# Patient Record
Sex: MALE | Race: WHITE
[De-identification: names, ages, dates, MRNs, and addresses within clinical notes are randomized per-mention and may not be internally consistent; named-entity substitution may affect disease eponyms.]

---

## 2020-02-25 ENCOUNTER — HOSPITAL ENCOUNTER (OUTPATIENT)
Dept: HOSPITAL 47 - RADUSWWP | Age: 25
Discharge: HOME | End: 2020-02-25
Attending: FAMILY MEDICINE
Payer: COMMERCIAL

## 2020-02-25 DIAGNOSIS — R59.0: Primary | ICD-10-CM

## 2020-02-26 NOTE — US
EXAMINATION TYPE: US groin RT

 

DATE OF EXAM: 2/25/2020

 

COMPARISON: NONE

 

CLINICAL HISTORY: R59.0 lymphadenopathy.

 

Patient's lump scanned posterior to testicles. 

Hypoechoic irregular shaped area here, possible small phlegmon or developing abscess. 

 

Prominent lymph nodes seen in groin, largest measuring 1.6 x 0.6cm

 

 

 

IMPRESSION:  A focal area of phlegmon or developing abscess.

## 2020-04-11 ENCOUNTER — HOSPITAL ENCOUNTER (EMERGENCY)
Dept: HOSPITAL 47 - EC | Age: 25
Discharge: HOME | End: 2020-04-11
Payer: COMMERCIAL

## 2020-04-11 VITALS — SYSTOLIC BLOOD PRESSURE: 136 MMHG | HEART RATE: 70 BPM | DIASTOLIC BLOOD PRESSURE: 89 MMHG

## 2020-04-11 VITALS — TEMPERATURE: 98.7 F | RESPIRATION RATE: 18 BRPM

## 2020-04-11 DIAGNOSIS — Z88.0: ICD-10-CM

## 2020-04-11 DIAGNOSIS — H43.391: Primary | ICD-10-CM

## 2020-04-11 PROCEDURE — 99283 EMERGENCY DEPT VISIT LOW MDM: CPT

## 2020-04-11 NOTE — ED
Eye Problem HPI





- General


Chief complaint: Eye Problems


Stated complaint: flashes of light in right eye


Time Seen by Provider: 04/11/20 13:11


Source: patient


Mode of arrival: ambulatory


Limitations: no limitations





- History of Present Illness


Initial comments: 


Patient is a 24-year-old male presents emergency Department with complaints of 

floaters in his right eye 2 days.  Patient states he noticed them mostly when 

he was outside.  Then yesterday he noticed flashes of light that happened 

randomly throughout the day.  He denies any pain in his right eye.  He denies 

any trauma to his head.  He denies any headaches, blurry vision, fever, ear 

pain.  He denies any dizziness.  He denies wearing glasses or contacts.  He has 

never had an eye exam.  He denies any other pertinent past medical history and 

takes no medications.  He has no other complaints at this time.  Upon arrival to

the ER, his vitals are stable.








- Related Data


                                Home Medications











 Medication  Instructions  Recorded  Confirmed


 


No Known Home Medications  07/28/15 04/11/20











                                    Allergies











Allergy/AdvReac Type Severity Reaction Status Date / Time


 


Penicillins Allergy  Unknown Verified 04/11/20 13:52














Review of Systems


ROS Statement: 


Those systems with pertinent positive or pertinent negative responses have been 

documented in the HPI.





ROS Other: All systems not noted in ROS Statement are negative.





Past Medical History


Past Medical History: No Reported History


History of Any Multi-Drug Resistant Organisms: None Reported


Additional Past Surgical History / Comment(s): testicle desension


Past Psychological History: No Psychological Hx Reported


Smoking Status: Never smoker


Past Alcohol Use History: Occasional


Past Drug Use History: None Reported





General Exam





- General Exam Comments


Initial Comments: 





GENERAL: 


Well-appearing, well-nourished and in no acute distress.





HEAD: 


Atraumatic, normocephalic.





EYES:


Pupils equal round and reactive to light, extraocular movements intact, sclera 

anicteric, conjunctiva are normal.  No foreign bodies.  Visual acuity is normal.





ENT: 


TMs normal, nares patent, oropharynx clear without exudates.  Moist mucous 

membranes.





NECK: 


Normal range of motion, supple without lymphadenopathy or JVD.





LUNGS:


 Breath sounds clear to auscultation bilaterally and equal.  No wheezes rales or

rhonchi.





HEART:


Regular rate and rhythm without murmurs, rubs or gallops.





ABDOMEN: 


Soft, nontender, normoactive bowel sounds.  No guarding, no rebound.  No masses 

appreciated.





: Deferred 





EXTREMITIES: 


Normal range of motion, no pitting or edema.  No clubbing or cyanosis.





NEUROLOGICAL: 


Cranial nerves II through XII grossly intact.  Normal speech, normal gait.





PSYCH:


Normal mood, normal affect.





SKIN:


 Warm, Dry, normal turgor, no rashes or lesions noted.


Limitations: no limitations





Course


                                   Vital Signs











  04/11/20 04/11/20





  12:58 14:14


 


Temperature 98.7 F 


 


Pulse Rate 95 70


 


Respiratory 18 18





Rate  


 


Blood Pressure 141/88 136/89


 


O2 Sat by Pulse 99 97





Oximetry  














Medical Decision Making





- Medical Decision Making





Patient is a 24-year-old male with sudden onset of right eye floaters and 

flashes of light over the past 2 days.  He denies any eye pain, headache, 

dizziness.  His exam is unremarkable.  No red flag symptoms.  No complete loss 

of vision, no black curtains.  I spoke with on-call ophthalmologist, Dr. Champion.

 He is okay with patient being discharged and will follow-up with him on Monday.

 Patient is in agreement with this plan and care.  Strict return parameters were

discussed with the patient he verbalizes understanding.  He is stable for 

discharge.  Case discussed with Dr. Lamb. 





Disposition


Clinical Impression: 


 Vitreous floaters of right eye





Disposition: HOME SELF-CARE


Condition: Stable


Instructions (If sedation given, give patient instructions):  Blurred Vision 

(ED)


Additional Instructions: 


Please return to the Emergency Department if symptoms worsen or any other 

concerns.


Follow-up with ophthalmologist as discussed.  Call their office Monday morning 

for appointment.


Is patient prescribed a controlled substance at d/c from ED?: No


Referrals: 


Kian Pollack MD [Primary Care Provider] - 1-2 days


Froilan Dai MD [STAFF PHYSICIAN] - 1-2 days

## 2021-04-08 ENCOUNTER — HOSPITAL ENCOUNTER (EMERGENCY)
Dept: HOSPITAL 47 - EC | Age: 26
Discharge: HOME | End: 2021-04-08
Payer: COMMERCIAL

## 2021-04-08 VITALS
DIASTOLIC BLOOD PRESSURE: 98 MMHG | TEMPERATURE: 98.3 F | RESPIRATION RATE: 18 BRPM | HEART RATE: 107 BPM | SYSTOLIC BLOOD PRESSURE: 149 MMHG

## 2021-04-08 DIAGNOSIS — Z88.0: ICD-10-CM

## 2021-04-08 DIAGNOSIS — Z23: ICD-10-CM

## 2021-04-08 DIAGNOSIS — M79.5: Primary | ICD-10-CM

## 2021-04-08 PROCEDURE — 90715 TDAP VACCINE 7 YRS/> IM: CPT

## 2021-04-08 PROCEDURE — 10120 INC&RMVL FB SUBQ TISS SMPL: CPT

## 2021-04-08 PROCEDURE — 90471 IMMUNIZATION ADMIN: CPT

## 2021-04-08 PROCEDURE — 99282 EMERGENCY DEPT VISIT SF MDM: CPT

## 2021-04-08 NOTE — ED
Skin/Abscess/FB HPI





- General


Chief complaint: Skin/Abscess/Foreign Body


Stated complaint: L leg injury


Time Seen by Provider: 04/08/21 13:24


Source: patient


Mode of arrival: ambulatory


Limitations: no limitations





- History of Present Illness


Initial comments: 





25-year-old male presents to the emergency department with a chief complaint of 

a foreign body in the leg.  Patient reports he was working with a wire wheel and

one of the wires when into his skin in the left leg.  Patient reports it is 

fully embedded into the scanner he is not able to see it.  States it measures 

about 2-3 cm.  Tetanus is not today.  He denies any numbness or tingling.  He 

denies any significant pain at the site of injury.





- Related Data


                                  Previous Rx's











 Medication  Instructions  Recorded


 


Cephalexin [Keflex] 500 mg PO Q6HR #40 cap 04/08/21











                                    Allergies











Allergy/AdvReac Type Severity Reaction Status Date / Time


 


Penicillins Allergy  Unknown Verified 04/08/21 13:17














Review of Systems


ROS Statement: 


Those systems with pertinent positive or pertinent negative responses have been 

documented in the HPI.





ROS Other: All systems not noted in ROS Statement are negative.





Past Medical History


Past Medical History: No Reported History


History of Any Multi-Drug Resistant Organisms: None Reported


Additional Past Surgical History / Comment(s): testicle desension


Past Psychological History: No Psychological Hx Reported


Smoking Status: Never smoker


Past Alcohol Use History: Occasional


Past Drug Use History: None Reported





General Exam


Limitations: no limitations


General appearance: alert, in no apparent distress


Head exam: Present: atraumatic, normocephalic, normal inspection


Eye exam: Present: normal appearance, PERRL, EOMI


Pupils: Present: normal accommodation


ENT exam: Present: normal exam, normal oropharynx, mucous membranes moist


Neck exam: Present: normal inspection, full ROM.  Absent: tenderness


Respiratory exam: Present: normal lung sounds bilaterally.  Absent: respiratory 

distress


Cardiovascular Exam: Present: regular rate, normal rhythm, normal heart sounds


Extremities exam: Present: normal inspection (probable foreign body under the 

skin of the left thigh.), full ROM, normal capillary refill.  Absent: tenderness


Back exam: Present: normal inspection, full ROM.  Absent: tenderness


Neurological exam: Present: alert, oriented X3


Psychiatric exam: Present: normal affect, normal mood


Skin exam: Present: warm, dry, intact, normal color





Course





                                   Vital Signs











  04/08/21





  13:17


 


Temperature 98.3 F


 


Pulse Rate 107 H


 


Respiratory 18





Rate 


 


Blood Pressure 149/98


 


O2 Sat by Pulse 100





Oximetry 














Procedures





- Forgein Body Removal Soft Tissue


Consent Obtained: verbal consent


Site: lower extremity (Left thigh)


Anesthetic Used: lidocaine 1%


Amount (mLs): 2


Foreign Body Suspected: Metal


Foreign Body Removed: yes


Foreign Body Removal Technique: Forceps


Patient Tolerated Procedure: well, no complications





Medical Decision Making





- Medical Decision Making





25-year-old male presents emergency Department with chief complaint of a foreign

 body.  On physical examination, there is a palpable foreign body under the skin

 of the left thigh.  I was able to remove it after making a small incision.  It 

was a piece of wire measuring approximately 3 cm in length.  The laceration site

 was thoroughly irrigated and closed with one suture.  Tetanus was updated.  

Patient will be started on Keflex.  Advised to return for suture removal.  Case 

discussed with Dr. Almanzar.





Disposition


Clinical Impression: 


 Soft tissues foreign body





Disposition: HOME SELF-CARE


Condition: Stable


Instructions (If sedation given, give patient instructions):  Soft Tissue 

Foreign Body (ED)


Additional Instructions: 


Take prescribed medication as directed.  Return to emergency department if sympt

oms worsen.  Please return to the emergency room in 10 days to have sutures 

removed.  Please watch for any signs of infection which may include increased 

pain, swelling, redness, fever or chills.  Please return to emergency room for 

any signs of infection do occur.  Please use clean soap and water over the area 

to prevent scabbing over your stitches.  Please leave wound covered for the 

first 24-48 hours and then leave wound open to air.  Please return to the 

emergency room for any other concerns.


Prescriptions: 


Cephalexin [Keflex] 500 mg PO Q6HR #40 cap


Is patient prescribed a controlled substance at d/c from ED?: No


Referrals: 


Kian Pollack MD [Primary Care Provider] - 1-2 days


Time of Disposition: 13:50

## 2021-08-25 ENCOUNTER — HOSPITAL ENCOUNTER (OUTPATIENT)
Dept: HOSPITAL 47 - RADECHMAIN | Age: 26
Discharge: HOME | End: 2021-08-25
Payer: COMMERCIAL

## 2021-08-25 DIAGNOSIS — I47.2: Primary | ICD-10-CM

## 2021-08-25 PROCEDURE — 93270 REMOTE 30 DAY ECG REV/REPORT: CPT

## 2021-09-22 ENCOUNTER — HOSPITAL ENCOUNTER (OUTPATIENT)
Dept: HOSPITAL 47 - RADXRMAIN | Age: 26
Discharge: HOME | End: 2021-09-22
Attending: NURSE PRACTITIONER
Payer: COMMERCIAL

## 2021-09-22 DIAGNOSIS — R07.89: Primary | ICD-10-CM

## 2021-09-22 PROCEDURE — 71046 X-RAY EXAM CHEST 2 VIEWS: CPT

## 2021-09-22 NOTE — XR
EXAMINATION TYPE: XR chest 2V

 

DATE OF EXAM: 9/22/2021

 

COMPARISON: Chest x-ray 6/17/2002

 

HISTORY: R07.89

 

TECHNIQUE:  Frontal and lateral views of the chest are obtained.

 

FINDINGS:  There is no focal air space opacity, pleural effusion, or pneumothorax seen.  The cardiac 
silhouette size is within normal limits.   The osseous structures are intact, there is a spinal curva
ture.

 

IMPRESSION:  No acute cardiopulmonary process. There is an underlying scoliosis.

## 2021-09-28 NOTE — P.CEMON
30 Day Event monitor note:





Patient wore an event monitor for 22 days from 8/25/2021 until 9/15/2021.  There

were a total of 464 hours of analyzable data.





Findings:  


Patient's baseline heart rate was normal sinus rhythm.


There were no signficant atrial fibrillation, atrial flutter, or ventricular 

tachycardia episodes. 


There were no significant pauses greater than 2 seconds. 


There were a total of 92 patient activated and automatically captured events.


Patient activated events described as palpitations, skipped beats corresponded 

with PVCs as well as PACs, however also occasionally corresponded with normal 

sinus rhythm.


PVCs were rare only occurring 7 times.





Conclusions:


30 day event monitor worn for 22 days.  Patient activated events corresponding 

with rare PVCs, PACs as well as with sinus rhythm.  No atrial fibrillation, 

ventricular tachycardia, pauses greater than 2 seconds noted. Patient information on fall and injury prevention

## 2021-09-30 NOTE — EM
30 Day Event monitor note:



Patient wore an event monitor for 22 days from 8/25/2021 until 9/15/2021.  There
were a total of 464 hours of analyzable data.



Findings:  

Patient's baseline heart rate was normal sinus rhythm.

There were no signficant atrial fibrillation, atrial flutter, or ventricular 
tachycardia episodes. 

There were no significant pauses greater than 2 seconds. 

There were a total of 92 patient activated and automatically captured events.

Patient activated events described as palpitations, skipped beats corresponded 
with PVCs as well as PACs, however also occasionally corresponded with normal 
sinus rhythm.

PVCs were rare only occurring 7 times.



Conclusions:

30 day event monitor worn for 22 days.  Patient activated events corresponding 
with rare PVCs, PACs as well as with sinus rhythm.  No atrial fibrillation, 
ventricular tachycardia, pauses greater than 2 seconds noted.





[facility]

## 2024-07-02 ENCOUNTER — HOSPITAL ENCOUNTER (EMERGENCY)
Dept: HOSPITAL 47 - EC | Age: 29
LOS: 1 days | Discharge: HOME | End: 2024-07-03
Payer: COMMERCIAL

## 2024-07-02 VITALS — TEMPERATURE: 98.1 F

## 2024-07-02 DIAGNOSIS — Z88.0: ICD-10-CM

## 2024-07-02 DIAGNOSIS — F32.A: Primary | ICD-10-CM

## 2024-07-02 PROCEDURE — 82075 ASSAY OF BREATH ETHANOL: CPT

## 2024-07-02 PROCEDURE — 99284 EMERGENCY DEPT VISIT MOD MDM: CPT

## 2024-07-02 NOTE — ED
General Adult HPI





- General


Chief complaint: Psychiatric Symptoms


Stated complaint: Anxiety


Time Seen by Provider: 07/02/24 21:35


Source: patient


Mode of arrival: ambulatory


Limitations: no limitations





- History of Present Illness


Initial comments: 


Dictation was produced using dragon dictation software. please excuse any 

grammatical, word or spelling errors. 











Chief Complaint: 28-year-old male presents to the emergency department for 

anxiety depression





History of Present Illness: Patient 28-year-old male he is brought in by wife 

and father.  Patient for the last week has been feeling depressed.  He is also 

having intrusive thoughts.  There are a lot of firearms in the household.  

States that she is worried about patient's safety which is why he is in the 

emergency department.  Patient denies any overt suicidal or homicidal ideations.

 No visual auditory hallucinations.  Patient not paranoid








The ROS documented in this emergency department record has been reviewed and 

confirmed by me.  Those systems with pertinent positive or negative responses 

have been documented in the HPI.  All other systems are other negative and/or 

noncontributory.




















- Related Data


                                  Previous Rx's











 Medication  Instructions  Recorded


 


Cephalexin [Keflex] 500 mg PO Q6HR #40 cap 04/08/21











                                    Allergies











Allergy/AdvReac Type Severity Reaction Status Date / Time


 


Penicillins Allergy  Unknown Verified 07/02/24 21:23














Review of Systems


ROS Statement: 


Those systems with pertinent positive or pertinent negative responses have been 

documented in the HPI.





ROS Other: All systems not noted in ROS Statement are negative.





Past Medical History


Past Medical History: No Reported History


History of Any Multi-Drug Resistant Organisms: None Reported


Additional Past Surgical History / Comment(s): testicle desension.  tumor 

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


Past Psychological History: No Psychological Hx Reported


Smoking Status: Never smoker


Past Alcohol Use History: Occasional


Past Drug Use History: Marijuana





General Exam





- General Exam Comments


Initial Comments: 

















General: Well-appearing, nontoxic, no acute distress.


Head: Normocephalic, atraumatic


Eyes: PERRLA, EOMI


ENT: Airway patent


Chest: Nonlabored breathing


Skin: No visual rash, normal skin tone


Neuro: Alert and oriented 3


Musculoskeletal: No gross abnormalities














Limitations: no limitations





Course


                                   Vital Signs











  07/02/24 07/03/24





  21:18 01:49


 


Temperature 98.1 F 


 


Pulse Rate 84 60


 


Respiratory 18 16





Rate  


 


Blood Pressure 132/87 108/68


 


O2 Sat by Pulse 98 98





Oximetry  














Medical Decision Making





- Medical Decision Making


Was pt. sent in by a medical professional or institution (, PA, NP, urgent 

care, hospital, or nursing home...) When possible be specific


@  -No


Did you speak to anyone other than the patient for history (EMS, parent, family,

 police, friend...)? What history was obtained from this source 


@  -No


Did you review nursing and triage notes (agree or disagree)?  Why? 


@  -I reviewed and agree with nursing and triage notes


Were old charts reviewed (outside hosp., previous admission, EMS record, old 

EKG, old radiological studies, urgent care reports/EKG's, nursing home records)?

 Report findings 


@  -No old charts were reviewed


Differential Diagnosis (chest pain, altered mental status, abdominal pain women,

 abdominal pain men, vaginal bleeding, musculoskeletal, weakness, fever, 

dyspnea, syncope, headache, dizziness, GI bleed, back pain, seizure, CVA, 

palpatations, mental health)? 


@  -Differential Mental Health:


Depression, anxiety, bipolar, psychosis, schizophrenia, borderline personality, 

situational depression, adjustment disorder, behavioral disorder, brain tumor, 

malingering, substance abuse, encephalopathy, medication reaction, dementia, 

hypothyroidism, degenerative neurologic disorder, lupus.... This is not meant to

 be all-inclusive list


EKG interpreted by me (3pts min.).


@  -None done


X-rays interpreted by me (1pt min.).


@  -None done


CT interpreted by me (1pt min.).


@  -None done


U/S interpreted by me (1pt. min.).


@  -None done


What testing was considered but not performed or refused? (CT, X-rays, U/S, 

labs)? Why?


@  -None


What meds were considered but not given or refused? Why?


@  -None


Was smoking cessation discussed for >3mins.?


@  -No


Were there social determinants of health that impacted care today? How? 

(Homelessness, low income, unemployed, alcoholism, drug addiction, 

transportation, low edu. Level, literacy, decrease access to med. care, alf, 

rehab)?


@  -No


Was there de-escalation of care discussed even if they declined (Discuss DNR or 

withdrawal of care, Hospice)? DNR status


@  -No


What co-morbidities impacted this encounter? (DM, HTN, Smoking, COPD, CAD, 

Cancer, CVA, ARF, Chemo, Hep., AIDS, mental health diagnosis, sleep apnea, 

morbid obesity)?


@  -None


Was patient admitted / discharged? Hospital course, mention meds given and 

route, prescriptions, significant lab abnormalities, going to OR and other 

pertinent info.


@  -28-year-old male presents to the ER along with the wife and father.  Patient

 has been having intrusive thoughts.  Sounds like he has contemplated suicide 

however does not feel suicidal.  Vital signs are stable.  Family is concerned 

about patient's wellbeing especially since there are firearms in the household. 

 Family requesting EPS evaluation.





Patient medically cleared for EPS.





Patient eval by EPS and all firearms were removed from the house.  Patient not 

having any active suicidal homicidal ideation.  He is cleared for discharge with

 close psychiatric outpatient follow-up.





Did you discuss the management of the patient with other professionals (prof

conradionals i.e. , PA, NP, lab, RT, psych nurse, , , 

teacher, , )? Give summary


@  -No


Was critical care preformed (if so, how long)?


@  -No


Undiagnosed new problem with uncertain prognosis?


@  -No


Drug Therapy requiring intensive monitoring for toxicity (Heparin, Nitro, 

Insulin, Cardizem)?


@  -No


Were any procedures done?


@  -No


Diagnosis/symptom?  Acute, or Chronic, or Acute on Chronic?  Uncomplicated 

(without systemic symptoms) or Complicated (systemic symptoms)?


@  -Depression


Side effects of treatment?


@  -No


Exacerbation, Progression, or Severe Exacerbation?


@  -No


Poses a threat to life or bodily function? How? (Chest pain, USA, MI, pneumonia,

 PE, COPD, DKA, ARF, appy, cholecystitis, CVA, Diverticulitis, Homicidal, 

Suicidal, threat to staff... and all critical care pts)


@  -yes











Disposition


Clinical Impression: 


 Depression





Disposition: HOME SELF-CARE


Condition: Fair


Instructions (If sedation given, give patient instructions):  Depression (ED)


Is patient prescribed a controlled substance at d/c from ED?: No


Referrals: 


Mattie Davies MD [Primary Care Provider] - 1-2 days


Time of Disposition: 02:08

## 2024-07-03 VITALS — RESPIRATION RATE: 18 BRPM | HEART RATE: 71 BPM

## 2024-07-03 VITALS — SYSTOLIC BLOOD PRESSURE: 108 MMHG | DIASTOLIC BLOOD PRESSURE: 68 MMHG
